# Patient Record
Sex: MALE | Race: WHITE | NOT HISPANIC OR LATINO | Employment: OTHER | URBAN - METROPOLITAN AREA
[De-identification: names, ages, dates, MRNs, and addresses within clinical notes are randomized per-mention and may not be internally consistent; named-entity substitution may affect disease eponyms.]

---

## 2023-11-01 ENCOUNTER — OFFICE VISIT (OUTPATIENT)
Dept: PRIMARY CARE CLINIC | Facility: CLINIC | Age: 68
End: 2023-11-01
Payer: MEDICARE

## 2023-11-01 VITALS
TEMPERATURE: 98 F | RESPIRATION RATE: 16 BRPM | SYSTOLIC BLOOD PRESSURE: 115 MMHG | BODY MASS INDEX: 23.2 KG/M2 | OXYGEN SATURATION: 95 % | HEIGHT: 67 IN | DIASTOLIC BLOOD PRESSURE: 73 MMHG | HEART RATE: 76 BPM | WEIGHT: 147.81 LBS

## 2023-11-01 DIAGNOSIS — F17.210 NICOTINE DEPENDENCE, CIGARETTES, UNCOMPLICATED: ICD-10-CM

## 2023-11-01 DIAGNOSIS — F33.41 RECURRENT MAJOR DEPRESSIVE DISORDER, IN PARTIAL REMISSION: ICD-10-CM

## 2023-11-01 DIAGNOSIS — K13.0 ANGULAR CHEILITIS: ICD-10-CM

## 2023-11-01 DIAGNOSIS — I25.10 CORONARY ARTERY DISEASE INVOLVING NATIVE CORONARY ARTERY OF NATIVE HEART WITHOUT ANGINA PECTORIS: ICD-10-CM

## 2023-11-01 DIAGNOSIS — G25.81 RESTLESS LEG SYNDROME: ICD-10-CM

## 2023-11-01 DIAGNOSIS — F17.200 TOBACCO DEPENDENCE: ICD-10-CM

## 2023-11-01 DIAGNOSIS — Z11.59 NEED FOR HEPATITIS C SCREENING TEST: ICD-10-CM

## 2023-11-01 DIAGNOSIS — Z00.00 ENCOUNTER FOR MEDICARE ANNUAL WELLNESS EXAM: ICD-10-CM

## 2023-11-01 DIAGNOSIS — E78.2 MIXED HYPERLIPIDEMIA: ICD-10-CM

## 2023-11-01 DIAGNOSIS — Z21 HIV POSITIVE, ASYMPTOMATIC: Primary | ICD-10-CM

## 2023-11-01 PROCEDURE — G0438 PR WELCOME MEDICARE ANNUAL WELLNESS INITIAL VISIT: ICD-10-PCS | Mod: S$GLB,,, | Performed by: INTERNAL MEDICINE

## 2023-11-01 PROCEDURE — 99204 PR OFFICE/OUTPT VISIT, NEW, LEVL IV, 45-59 MIN: ICD-10-PCS | Mod: 25,S$GLB,, | Performed by: INTERNAL MEDICINE

## 2023-11-01 PROCEDURE — G0438 PPPS, INITIAL VISIT: HCPCS | Mod: S$GLB,,, | Performed by: INTERNAL MEDICINE

## 2023-11-01 PROCEDURE — 99204 OFFICE O/P NEW MOD 45 MIN: CPT | Mod: 25,S$GLB,, | Performed by: INTERNAL MEDICINE

## 2023-11-01 RX ORDER — EZETIMIBE 10 MG/1
10 TABLET ORAL DAILY
COMMUNITY
End: 2023-12-12

## 2023-11-01 RX ORDER — PRAMIPEXOLE DIHYDROCHLORIDE 0.5 MG/1
0.5 TABLET ORAL 3 TIMES DAILY
COMMUNITY
End: 2024-03-27 | Stop reason: SDUPTHER

## 2023-11-01 RX ORDER — ATORVASTATIN CALCIUM 80 MG/1
80 TABLET, FILM COATED ORAL DAILY
COMMUNITY
End: 2024-02-26 | Stop reason: SDUPTHER

## 2023-11-01 RX ORDER — EFAVIRENZ, EMTRICITABINE AND TENOFOVIR DISOPROXIL FUMARATE 600; 200; 300 MG/1; MG/1; MG/1
1 TABLET, FILM COATED ORAL NIGHTLY
COMMUNITY
End: 2024-02-26 | Stop reason: SDUPTHER

## 2023-11-01 RX ORDER — BUPROPION HYDROCHLORIDE 300 MG/1
300 TABLET ORAL DAILY
COMMUNITY

## 2023-11-01 RX ORDER — SERTRALINE HYDROCHLORIDE 100 MG/1
100 TABLET, FILM COATED ORAL DAILY
COMMUNITY

## 2023-11-01 RX ORDER — OXCARBAZEPINE 150 MG/1
150 TABLET, FILM COATED ORAL 2 TIMES DAILY
COMMUNITY
End: 2024-02-26 | Stop reason: SDUPTHER

## 2023-11-01 RX ORDER — LANOLIN ALCOHOL/MO/W.PET/CERES
100 CREAM (GRAM) TOPICAL DAILY
COMMUNITY

## 2023-11-01 RX ORDER — FOLIC ACID 0.4 MG
400 TABLET ORAL DAILY
COMMUNITY

## 2023-11-01 NOTE — PROGRESS NOTES
Subjective:      Patient ID: Hemant Pepe is a 68 y.o. male.    Chief Complaint: Rash (Rash on lower lip, onset 2-3 wks prior)    Patient is here to establish care and for wellness exam.    Patient is tested positive for HIV in 2003 (not sure how he contracted it), patient is on medication since the diagnosis and is compliant with medication.  Patient does not remember his CD4 count but reports viral load was undetectable.  Patient denies any complaints    Patient also has history of coronary artery disease s/p CABG in 2017.  Patient reports tolerating the med surgery well.  Patient denies any chest pain shortness some breath ankle swelling.  Patient is on atorvastatin and Zetia and taking medication regularly.  Patient is also on Eliquis for anticoagulation after surgery.  Of atrial fibrillation.    Patient has depression and is on Wellbutrin and Zoloft and symptoms are under good control.  Patient denies any crying spell, lack of energy/interest, no feeling of guilt worthlessness, no suicidal homicidal ideation.  Patient has restless leg syndrome and is taking Mirapex with much help.    Has chilitis involving bilateral corners of the lip x few weeks. The rash is itchy, no oozing or discharge. Patient denies any blister.  Patient uses dentures + denies dry mouth.       Review of Systems   Constitutional:  Negative for chills, diaphoresis, fever, malaise/fatigue and weight loss.   HENT:  Negative for congestion, ear pain, sinus pain, sore throat and tinnitus.    Eyes:  Negative for blurred vision and photophobia.   Respiratory:  Negative for cough, hemoptysis, shortness of breath and wheezing.    Cardiovascular:  Negative for chest pain, palpitations, orthopnea, leg swelling and PND.   Gastrointestinal:  Negative for abdominal pain, blood in stool, constipation, diarrhea, heartburn, melena, nausea and vomiting.   Genitourinary:  Negative for dysuria, frequency and urgency.   Musculoskeletal:  Negative for  "back pain, myalgias and neck pain.   Skin:  Positive for rash.   Neurological:  Negative for dizziness, tremors, seizures, loss of consciousness and weakness.   Endo/Heme/Allergies:  Negative for polydipsia.   Psychiatric/Behavioral:  Negative for depression and hallucinations. The patient does not have insomnia.      Objective:   /73 (BP Location: Left arm, Patient Position: Sitting, BP Method: Medium (Automatic))   Pulse 76   Temp 97.6 °F (36.4 °C) (Temporal)   Resp 16   Ht 5' 7" (1.702 m)   Wt 67 kg (147 lb 12.8 oz)   SpO2 95%   BMI 23.15 kg/m²     Physical Exam  Constitutional:       General: He is not in acute distress.     Appearance: He is not diaphoretic.   Neck:      Thyroid: No thyromegaly.   Cardiovascular:      Rate and Rhythm: Normal rate and regular rhythm.      Heart sounds: Normal heart sounds. No murmur heard.  Pulmonary:      Effort: Pulmonary effort is normal. No respiratory distress.      Breath sounds: Normal breath sounds. No wheezing.   Abdominal:      General: Bowel sounds are normal. There is no distension.      Palpations: Abdomen is soft.      Tenderness: There is no abdominal tenderness.   Lymphadenopathy:      Cervical: No cervical adenopathy.   Skin:     Comments: Raw skin with mild erythema noted on both corners of the lips   Neurological:      Mental Status: He is alert and oriented to person, place, and time.   Psychiatric:         Behavior: Behavior normal.         Thought Content: Thought content normal.         Judgment: Judgment normal.       Assessment:       ICD-10-CM ICD-9-CM   1. HIV positive, asymptomatic  Z21 V08   2. Coronary artery disease involving native coronary artery of native heart without angina pectoris  I25.10 414.01   3. Recurrent major depressive disorder, in partial remission  F33.41 296.35   4. Mixed hyperlipidemia  E78.2 272.2   5. Restless leg syndrome  G25.81 333.94   6. Angular cheilitis  K13.0 528.5   7. Tobacco dependence  F17.200 305.1 "   8. Nicotine dependence, cigarettes, uncomplicated  F17.210 305.1   9. Need for hepatitis C screening test  Z11.59 V73.89   10. Encounter for Medicare annual wellness exam  Z00.00 V70.0       Plan:     Patient with HIV, viral load is undetectable CD4 count is not known.  Will order viral load and CD4 count.   Will refer to Infectious Disease  Patient has coronary artery disease s/p CABG.  Aggressive blood pressure, cholesterol control is recommended  Patient is on Eliquis and not sure why he is on the medication.  Will get notes from previous PCP  Patient has Chilitis advised patient to use Petroleum jelly and Clotirmazole cream.  Patient reports depression is under okay control Will continue medication for now  Patient did feel a little depressed after moving from [place] to [place] but reports mood is getting better.  Patient has restless leg syndrome that is controlled with Mirapex.  Will continue medicine      Medication List with Changes/Refills   Current Medications    APIXABAN (ELIQUIS) 5 MG TAB    Take 5 mg by mouth 2 (two) times daily.    ATORVASTATIN (LIPITOR) 80 MG TABLET    Take 80 mg by mouth once daily.    BUPROPION (WELLBUTRIN XL) 300 MG 24 HR TABLET    Take 300 mg by mouth once daily.    CYANOCOBALAMIN (VITAMIN B-12) 1000 MCG TABLET    Take 100 mcg by mouth once daily. OTC    EFAVIRENZ-EMTRICTABINE-TENOFOVIR 600-200-300 MG (ATRIPLA) 600-200-300 MG TAB    Take 1 tablet by mouth every evening.    EZETIMIBE (ZETIA) 10 MG TABLET    Take 10 mg by mouth once daily.    FOLIC ACID (FOLVITE) 400 MCG TABLET    Take 400 mcg by mouth once daily.    OXCARBAZEPINE (TRILEPTAL) 150 MG TAB    Take 150 mg by mouth 2 (two) times daily.    PRAMIPEXOLE (MIRAPEX) 0.5 MG TABLET    Take 0.5 mg by mouth 3 (three) times daily.    SERTRALINE (ZOLOFT) 100 MG TABLET    Take 100 mg by mouth once daily.        Hemant Pepe presented for a  Medicare AWV and comprehensive Health Risk Assessment today. The following  components were reviewed and updated:      Health Risk Assessment  During the past four weeks, was someone available to help if you needed and wanted help?: Yes, as much as I wanted  Can you get to places out of walking distance without help?  (For example, can you travel alone on buses or taxis, or drive your own car?): Yes  Can you go shopping for groceries or clothes without someone's help?: Yes  Can you prepare your own meals?: Yes  Can you do your own housework without help?: Yes  Are you a smoker?: Yes, I'm not ready to quit   The patient has no Health Maintenance topics of status Not Due     Patient Care Team:  Andrew Walters MD as PCP - General (Internal Medicine)          ** See Completed Assessments for Annual Wellness Visit within the encounter summary.**     Patient reports he has received 2 COVID vaccines.  Advised patient to get booster  Patient is not aware of his pneumonia vaccine status.  Will get notes from previous PCP  Patient reports he has received Shingrix vaccine  Will administer flu vaccine  Patient colonoscopy was due in 2022.  Will order  Patient has more than 40 pack years of smoking.  Will refer to smoking cessation program  Will order CT chest for lung cancer screening        The following assessments were completed:    Depression Screening  Depression Patient Health Questionnaire (PHQ-2)  Over the last two weeks how often have you been bothered by little interest or pleasure in doing things: Not at all  Over the last two weeks how often have you been bothered by feeling down, depressed or hopeless: Not at all  PHQ-2 Total Score: 0   T  Whisper Test  Whisper Test: Normal     Cognitive Function Screening  Mini-Cog Instant Recall  How many words have been recalled?: 3  Clock Drawing Test  The total for the clock drawing test is either 0 or 2: Yes  Clock Drawing Score: 2  Mini-Cog 3 minute recall  How many words have been recalled?: 3  Cognitive Score: 5     Nutrition Screening: Body  mass index is 23.15 kg/m².  ADL Screening:  Patient reports he is able to do ADLs without any assistance    Patient denies use of control medication including narcotics or benzodiazepines    Provided Hemant with a 5-10 year written screening schedule and personal prevention plan. Recommendations were developed using the USPSTF age appropriate recommendations. Education, counseling, and referrals were provided as needed. After Visit Summary printed and given to patient which includes a list of additional screenings\tests needed.    I offered to discuss end of life issues, including information on how to make advance directives that the patient could use to name someone who would make medical decisions on their behalf if they became too ill to make themselves.    ___Patient declined - already done.  ___Virtual Visit, not discussed  _xx__Patient is interested, I provided paperwork and offered to discuss        No follow-ups on file.    Andrew Walters MD

## 2023-11-08 LAB
ABS NRBC COUNT: 0 THOU/UL (ref 0–0.01)
ABSOLUTE BASOPHIL: 0 10*3/UL (ref 0–0.3)
ABSOLUTE EOSINOPHIL: 0.2 10*3/UL (ref 0–0.6)
ABSOLUTE IMMATURE GRAN: 0.01 THOU/UL (ref 0–0.03)
ABSOLUTE LYMPHOCYTE: 1.4 10*3/UL (ref 1.2–4)
ABSOLUTE MONOCYTE: 0.4 10*3/UL (ref 0.1–0.8)
ALBUMIN SERPL BCP-MCNC: 3.5 G/DL (ref 3.4–5)
ALP SERPL-CCNC: 89 U/L (ref 45–117)
ALT SERPL W P-5'-P-CCNC: 24 U/L (ref 16–61)
ANION GAP SERPL CALC-SCNC: 9 MMOL/L (ref 3–11)
AST SERPL-CCNC: 26 U/L (ref 15–37)
BASOPHILS NFR BLD: 0.7 % (ref 0–3)
BILIRUB SERPL-MCNC: 0.4 MG/DL (ref 0.2–1)
BUN SERPL-MCNC: 14 MG/DL (ref 7–18)
BUN/CREAT SERPL: 11.76 RATIO
CALCIUM SERPL-MCNC: 8.5 MG/DL (ref 8.5–10.1)
CHLORIDE SERPL-SCNC: 106 MMOL/L (ref 98–107)
CHOLEST SERPL-MSCNC: 125 MG/DL
CO2 SERPL-SCNC: 25 MMOL/L (ref 21–32)
CREAT SERPL-MCNC: 1.19 MG/DL (ref 0.7–1.3)
EOSINOPHIL NFR BLD: 3.6 % (ref 0–6)
ERYTHROCYTE [DISTWIDTH] IN BLOOD BY AUTOMATED COUNT: 12.3 % (ref 0–15.5)
GFR ESTIMATION: > 60
GLUCOSE SERPL-MCNC: 93 MG/DL (ref 74–106)
HCT VFR BLD AUTO: 42.2 % (ref 42–52)
HCV AB SERPL QL IA: NONREACTIVE
HDLC SERPL-MCNC: 53 MG/DL
HGB BLD-MCNC: 14.1 G/DL (ref 14–18)
IMMATURE GRANULOCYTES: 0.2 % (ref 0–0.43)
LDLC SERPL CALC-MCNC: 59.4 MG/DL
LYMPHOCYTES NFR BLD: 24.7 % (ref 20–45)
MACROCYTES BLD QL SMEAR: NORMAL
MCH RBC QN AUTO: 37.1 PG (ref 27–32)
MCHC RBC AUTO-ENTMCNC: 33.4 % (ref 32–36)
MCV RBC AUTO: 111.1 FL (ref 80–97)
MONOCYTES NFR BLD: 7.6 % (ref 2–10)
NEUTROPHILS # BLD AUTO: 3.5 10*3/UL (ref 1.4–7)
NEUTROPHILS NFR BLD: 63.2 % (ref 50–80)
NUCLEATED RED BLOOD CELLS: 0 % (ref 0–0.2)
PLATELETS: 214 10*3/UL (ref 130–400)
PMV BLD AUTO: 10.9 FL (ref 9.2–12.2)
POLYCHROMASIA BLD QL SMEAR: NORMAL
POTASSIUM SERPL-SCNC: 4.1 MMOL/L (ref 3.5–5.1)
PROT SERPL-MCNC: 6.5 G/DL (ref 6.4–8.2)
RBC # BLD AUTO: 3.8 10*6/UL (ref 4.7–6.1)
SODIUM BLD-SCNC: 140 MMOL/L (ref 131–143)
TRIGL SERPL-MCNC: 63 MG/DL (ref 0–149)
TSH SERPL DL<=0.005 MIU/L-ACNC: 2.62 UIU/ML (ref 0.36–3.74)
VLDL CHOLESTEROL: 13 MG/DL
WBC # BLD: 5.6 10*3/UL (ref 4.5–10)

## 2023-11-10 DIAGNOSIS — D53.9 NUTRITIONAL ANEMIA, UNSPECIFIED: Primary | ICD-10-CM

## 2023-11-10 LAB
CD4 %: 42 % (ref 35–68)
CD4 INTERPRETATION: NORMAL
CD4: 595 CELLS/UL (ref 490–1600)

## 2023-11-13 ENCOUNTER — TELEPHONE (OUTPATIENT)
Dept: PRIMARY CARE CLINIC | Facility: CLINIC | Age: 68
End: 2023-11-13

## 2023-11-13 NOTE — TELEPHONE ENCOUNTER
----- Message from Aurea Otero MA sent at 11/10/2023  5:11 PM CST -----  Contact: self    ----- Message -----  From: Chika Chapa  Sent: 11/10/2023  12:20 PM CST  To: Romeo Morales Staff    Type: Staff Message    Caller: Hemant Pepe    Call Back Number: [phone]    Nature of the Call: returning Aurea white called  Additional Information: na

## 2023-11-16 LAB
HIV 1 AB BY MULTISPOT: POSITIVE
HIV 1/2 AB DIFFERENTIATION: ABNORMAL
HIV 2 AB BY MULTISPOT: NEGATIVE
HIV DIFFERENTIATION INTERPRETATION: ABNORMAL
HIV-1 QUANT BY NAAT (COPIES/ML): NOT DETECTED CPY/ML
HIV-1 QUANT BY NAAT (LOG COPIES/ML): NOT DETECTED LOG CPY/ML
HIV-1 QUANT BY NAAT INTERP: NOT DETECTED

## 2023-11-20 ENCOUNTER — TELEPHONE (OUTPATIENT)
Dept: PRIMARY CARE CLINIC | Facility: CLINIC | Age: 68
End: 2023-11-20

## 2023-11-20 NOTE — TELEPHONE ENCOUNTER
----- Message from Julio Cesar Moran MA sent at 11/17/2023 11:25 AM CST -----  Contact: self    ----- Message -----  From: Kelsey Taylor  Sent: 11/17/2023  10:07 AM CST  To: Romeo Morales Staff    Type:  Patient Returning Call    Who Called: Hemant Pepe   Who Left Message for Patient: Aurea  Does the patient know what this is regarding?: Results  Would the patient rather a call back or a response via [facility]?  Call back  Best Call Back Number: [phone]   Additional Information: n/a

## 2023-12-12 ENCOUNTER — OFFICE VISIT (OUTPATIENT)
Dept: PRIMARY CARE CLINIC | Facility: CLINIC | Age: 68
End: 2023-12-12
Payer: MEDICARE

## 2023-12-12 VITALS
SYSTOLIC BLOOD PRESSURE: 122 MMHG | HEART RATE: 65 BPM | BODY MASS INDEX: 22.44 KG/M2 | TEMPERATURE: 97 F | OXYGEN SATURATION: 97 % | DIASTOLIC BLOOD PRESSURE: 73 MMHG | RESPIRATION RATE: 16 BRPM | WEIGHT: 143 LBS | HEIGHT: 67 IN

## 2023-12-12 DIAGNOSIS — Z13.6 SCREENING FOR AAA (AORTIC ABDOMINAL ANEURYSM): ICD-10-CM

## 2023-12-12 DIAGNOSIS — Z12.11 COLON CANCER SCREENING: ICD-10-CM

## 2023-12-12 DIAGNOSIS — Z21 ASYMPTOMATIC HIV INFECTION, WITH NO HISTORY OF HIV-RELATED ILLNESS: Primary | ICD-10-CM

## 2023-12-12 DIAGNOSIS — I25.10 CORONARY ARTERY DISEASE INVOLVING NATIVE CORONARY ARTERY OF NATIVE HEART WITHOUT ANGINA PECTORIS: ICD-10-CM

## 2023-12-12 PROCEDURE — 99214 OFFICE O/P EST MOD 30 MIN: CPT | Mod: S$GLB,,, | Performed by: INTERNAL MEDICINE

## 2023-12-12 PROCEDURE — 99214 PR OFFICE/OUTPT VISIT, EST, LEVL IV, 30-39 MIN: ICD-10-PCS | Mod: S$GLB,,, | Performed by: INTERNAL MEDICINE

## 2023-12-12 NOTE — PROGRESS NOTES
Subjective:      Patient ID: Hemant Pepe is a 68 y.o. male.    Chief Complaint: Follow-up (1month f/u discuss need for colonoscopy)     Patient is tested positive for HIV in 2003 and is on antiviral.  Patient is tolerating the medication well and reports compliance with medication.  Patient last CD4 count is 595 and viral load is undetectable.  Patient denies any complaints    Patient has coronary artery disease s/p CABG in 2017.  Patient denies any chest pain shortness of breath ankle swelling.  Patient last LDL of 59 is not at goal.  Patient reports compliance with atorvastatin and Zetia.  Patient is on Eliquis for anticoagulation after surgery but denies history of atrial fibrillation.  Patient is still smoking and a CT chest for lung cancer screening was ordered but not yet done.  Patient is also refer to smoking cessation program but has not yet received an appointment.    Patient has depression that is under good control with Wellbutrin and Zoloft.      Review of Systems   Constitutional:  Negative for chills, diaphoresis, fever, malaise/fatigue and weight loss.   HENT:  Negative for congestion, ear pain, sinus pain, sore throat and tinnitus.    Eyes:  Negative for blurred vision and photophobia.   Respiratory:  Negative for cough, hemoptysis, shortness of breath and wheezing.    Cardiovascular:  Negative for chest pain, palpitations, orthopnea, leg swelling and PND.   Gastrointestinal:  Negative for abdominal pain, blood in stool, constipation, diarrhea, heartburn, melena, nausea and vomiting.   Genitourinary:  Negative for dysuria, frequency and urgency.   Musculoskeletal:  Negative for back pain, myalgias and neck pain.   Skin:  Negative for rash.   Neurological:  Negative for dizziness, tremors, seizures, loss of consciousness and weakness.   Endo/Heme/Allergies:  Negative for polydipsia.   Psychiatric/Behavioral:  Negative for depression and hallucinations. The patient does not have insomnia.   "    Objective:   /73 (BP Location: Left arm, Patient Position: Sitting, BP Method: Medium (Automatic))   Pulse 65   Temp 97 °F (36.1 °C) (Temporal)   Resp 16   Ht 5' 7" (1.702 m)   Wt 64.9 kg (143 lb)   SpO2 97%   BMI 22.40 kg/m²     Physical Exam: Patient Not examined  Assessment:       ICD-10-CM ICD-9-CM   1. Asymptomatic HIV infection, with no history of HIV-related illness  Z21 V08   2. Coronary artery disease involving native coronary artery of native heart without angina pectoris  I25.10 414.01   3. Colon cancer screening  Z12.11 V76.51   4. Screening for AAA (aortic abdominal aneurysm)  Z13.6 V81.2   5. HIV positive, asymptomatic  Z21 V08       Plan:     Patient with HIV with viral load undetectable and CD4 count of 595  Patient is on antiviral and is tolerating the medication well  Will refer to Infectious Disease  Patient has coronary artery disease and last LDL of 59 is not at goal  Will add Nexlizet and stop Zetia  Will refer to GI for screening colonoscopy  Will order ultrasound to screen for abdominal aortic aneurysm  Patient is on Eliquis for anticoagulation but not sure the cause/reason of prescribing medication  Will reach out to patient cardiologist Michele Baker [phone].  To discuss need to continue anticoagulation.  Patient is referred to smoking cessation program but has not yet received an appointment.  Will follow-up on referral  Patient is also refer for CT chest for lung cancer screening but has not yet received an appointment.  Will follow-up on referral    Medication List with Changes/Refills   Current Medications    APIXABAN (ELIQUIS) 5 MG TAB    Take 5 mg by mouth 2 (two) times daily.    ATORVASTATIN (LIPITOR) 80 MG TABLET    Take 80 mg by mouth once daily.    BUPROPION (WELLBUTRIN XL) 300 MG 24 HR TABLET    Take 300 mg by mouth once daily.    CYANOCOBALAMIN (VITAMIN B-12) 1000 MCG TABLET    Take 100 mcg by mouth once daily. OTC    EFAVIRENZ-EMTRICTABINE-TENOFOVIR " 600-200-300 MG (ATRIPLA) 600-200-300 MG TAB    Take 1 tablet by mouth every evening.    EZETIMIBE (ZETIA) 10 MG TABLET    Take 10 mg by mouth once daily.    FOLIC ACID (FOLVITE) 400 MCG TABLET    Take 400 mcg by mouth once daily.    OXCARBAZEPINE (TRILEPTAL) 150 MG TAB    Take 150 mg by mouth 2 (two) times daily.    PRAMIPEXOLE (MIRAPEX) 0.5 MG TABLET    Take 0.5 mg by mouth 3 (three) times daily.    SERTRALINE (ZOLOFT) 100 MG TABLET    Take 100 mg by mouth once daily.

## 2023-12-14 ENCOUNTER — TELEPHONE (OUTPATIENT)
Dept: PRIMARY CARE CLINIC | Facility: CLINIC | Age: 68
End: 2023-12-14
Payer: MEDICARE

## 2023-12-19 ENCOUNTER — TELEPHONE (OUTPATIENT)
Dept: PRIMARY CARE CLINIC | Facility: CLINIC | Age: 68
End: 2023-12-19
Payer: MEDICARE

## 2023-12-29 NOTE — PROGRESS NOTES
Clinic Note    Reason for visit:  The primary encounter diagnosis was Encounter for current long-term use of anticoagulants. A diagnosis of Colon cancer screening was also pertinent to this visit.    PCP: Andrew Walters / [place] [id number]    HPI:  This is a 68 y.o. male here to be established. Referred for CRC screening. He did have colonoscopy 10 years ago which was normal. Having daily BM. No blood in the stool.     11/8/2023 HIV AB+, VL-not detected.    Review of Systems   Constitutional:  Negative for diaphoresis, fatigue, fever and unexpected weight change.   HENT:  Negative for hearing loss, mouth sores, postnasal drip, sore throat and trouble swallowing.    Eyes:  Negative for pain, discharge and eye dryness.   Respiratory:  Negative for apnea, cough, choking, chest tightness, shortness of breath and wheezing.    Cardiovascular:  Negative for chest pain, palpitations and leg swelling.   Gastrointestinal:  Negative for abdominal distention, abdominal pain, anal bleeding, blood in stool, change in bowel habit, constipation, diarrhea, nausea, rectal pain, vomiting, reflux and fecal incontinence.   Genitourinary:  Negative for bladder incontinence, dysuria and hematuria.   Musculoskeletal:  Negative for arthralgias, back pain and joint swelling.   Integumentary:  Negative for color change and rash.   Allergic/Immunologic: Negative for environmental allergies and food allergies.   Neurological:  Negative for seizures and headaches.   Hematological:  Negative for adenopathy. Does not bruise/bleed easily.        Past Medical History:   Diagnosis Date    CAD (coronary artery disease)     Depression     HIV infection     Hyperlipidemia     Restless leg syndrome      Past Surgical History:   Procedure Laterality Date    CORONARY ARTERY BYPASS GRAFT  11/2017    [place]     Family History   Problem Relation Age of Onset    Heart failure Mother     Prostate cancer Father     Heart failure  Sister     Prostate cancer Brother     Colon cancer Neg Hx     Colon polyps Neg Hx     Irritable bowel syndrome Neg Hx     Liver cancer Neg Hx     Rectal cancer Neg Hx     Stomach cancer Neg Hx     Ulcerative colitis Neg Hx      Social History     Tobacco Use    Smoking status: Every Day     Current packs/day: 0.50     Average packs/day: 0.9 packs/day for 49.0 years (45.5 ttl pk-yrs)     Types: Cigarettes     Start date: 1975    Smokeless tobacco: Never   Substance Use Topics    Alcohol use: Yes     Alcohol/week: 1.0 standard drink of alcohol     Types: 1 Shots of liquor per week    Drug use: Not Currently     Review of patient's allergies indicates:  No Known Allergies   Medication List with Changes/Refills   New Medications    SOD SULF-POT CHLORIDE-MAG SULF (SUTAB) 1.479-0.188- 0.225 GRAM TABLET    Take according to package instructions with indicated amount of water. No breakfast day before test. May substitute with Suprep, Clenpiq, Plenvu, Moviprep or GoLytely based on Rx plan and patient preference.   Current Medications    APIXABAN (ELIQUIS) 5 MG TAB    Take 5 mg by mouth 2 (two) times daily.    ATORVASTATIN (LIPITOR) 80 MG TABLET    Take 80 mg by mouth once daily.    BEMPEDOIC ACID-EZETIMIBE 180-10 MG TAB    Take 1 tablet by mouth every evening.    BUPROPION (WELLBUTRIN XL) 300 MG 24 HR TABLET    Take 300 mg by mouth once daily.    CYANOCOBALAMIN (VITAMIN B-12) 1000 MCG TABLET    Take 100 mcg by mouth once daily. OTC    EFAVIRENZ-EMTRICTABINE-TENOFOVIR 600-200-300 MG (ATRIPLA) 600-200-300 MG TAB    Take 1 tablet by mouth every evening.    FOLIC ACID (FOLVITE) 400 MCG TABLET    Take 400 mcg by mouth once daily.    OXCARBAZEPINE (TRILEPTAL) 150 MG TAB    Take 150 mg by mouth 2 (two) times daily.    PRAMIPEXOLE (MIRAPEX) 0.5 MG TABLET    Take 0.5 mg by mouth 3 (three) times daily.    SERTRALINE (ZOLOFT) 100 MG TABLET    Take 100 mg by mouth once daily.         Vital Signs:  /72 (BP Location: Left arm,  "Patient Position: Sitting)   Pulse 83   Ht 5' 7" (1.702 m)   Wt 65.5 kg (144 lb 6.4 oz)   SpO2 98%   BMI 22.62 kg/m²        Physical Exam  Constitutional:       General: He is not in acute distress.     Appearance: Normal appearance. He is well-developed. He is not ill-appearing or toxic-appearing.   HENT:      Head: Normocephalic and atraumatic.      Nose: Nose normal.      Mouth/Throat:      Mouth: Mucous membranes are moist.      Pharynx: Oropharynx is clear. No oropharyngeal exudate or posterior oropharyngeal erythema.   Eyes:      General: Lids are normal. No scleral icterus.        Right eye: No discharge.         Left eye: No discharge.      Conjunctiva/sclera: Conjunctivae normal.   Cardiovascular:      Rate and Rhythm: Normal rate and regular rhythm.      Pulses:           Radial pulses are 2+ on the right side and 2+ on the left side.   Pulmonary:      Effort: Pulmonary effort is normal. No respiratory distress.      Breath sounds: No stridor. No wheezing.   Abdominal:      General: Bowel sounds are normal. There is no distension.      Palpations: Abdomen is soft. There is no fluid wave, hepatomegaly, splenomegaly or mass.      Tenderness: There is no abdominal tenderness. There is no guarding or rebound.   Musculoskeletal:      Cervical back: Full passive range of motion without pain.   Lymphadenopathy:      Cervical: No cervical adenopathy.   Skin:     General: Skin is warm and dry.      Capillary Refill: Capillary refill takes less than 2 seconds.      Coloration: Skin is not cyanotic, jaundiced or pale.   Neurological:      General: No focal deficit present.      Mental Status: He is alert and oriented to person, place, and time.   Psychiatric:         Mood and Affect: Mood normal.         Behavior: Behavior is cooperative.            All of the data above and below has been reviewed by myself and any further interpretations will be reflected in the assessment and plan.   The data includes review " of external notes, and independent interpretation of lab results, procedures, x-rays, and imaging reports.      Assessment:  Encounter for current long-term use of anticoagulants    Colon cancer screening  -     Ambulatory referral/consult to Gastroenterology  -     Ambulatory Referral to External Surgery  -     sod sulf-pot chloride-mag sulf (SUTAB) 1.479-0.188- 0.225 gram tablet; Take according to package instructions with indicated amount of water. No breakfast day before test. May substitute with Suprep, Clenpiq, Plenvu, Moviprep or GoLytely based on Rx plan and patient preference.  Dispense: 24 tablet; Refill: 0      CRC screening- due for CRC screening  Will need to stop Eliquis 2 days before procedure.     Recommendations:    Schedule for Colonsocopy with Dr. Styles.  Stop Eliquis 2 days before procedure.    Risks, benefits, and alternatives of medical management, any associated procedures, and/or treatment discussed with the patient. Patient given opportunity to ask questions and voices understanding. Patient has elected to proceed with the recommended care modalities as discussed.    Follow up if symptoms worsen or fail to improve.    Order summary:  Orders Placed This Encounter   Procedures    Ambulatory Referral to External Surgery        Instructed patient to notify my office if they have not been contacted within two weeks after any procedures, submitting any samples (biopsies, blood, stool, urine, etc.) or after any imaging (X-ray, CT, MRI, etc.).      Polly Garcia NP    This document may have been created using a voice recognition transcribing system. Incorrect words or phrases may have been missed during proofreading. Please interpret accordingly or contact me for clarification.

## 2024-01-03 ENCOUNTER — OFFICE VISIT (OUTPATIENT)
Dept: GASTROENTEROLOGY | Facility: CLINIC | Age: 69
End: 2024-01-03
Payer: MEDICARE

## 2024-01-03 VITALS
HEIGHT: 67 IN | HEART RATE: 83 BPM | WEIGHT: 144.38 LBS | BODY MASS INDEX: 22.66 KG/M2 | DIASTOLIC BLOOD PRESSURE: 72 MMHG | OXYGEN SATURATION: 98 % | SYSTOLIC BLOOD PRESSURE: 110 MMHG

## 2024-01-03 DIAGNOSIS — Z79.01 ENCOUNTER FOR CURRENT LONG-TERM USE OF ANTICOAGULANTS: Primary | ICD-10-CM

## 2024-01-03 DIAGNOSIS — Z12.11 COLON CANCER SCREENING: ICD-10-CM

## 2024-01-03 PROCEDURE — 99212 OFFICE O/P EST SF 10 MIN: CPT | Mod: S$GLB,,, | Performed by: NURSE PRACTITIONER

## 2024-01-03 RX ORDER — SOD SULF/POT CHLORIDE/MAG SULF 1.479 G
TABLET ORAL
Qty: 24 TABLET | Refills: 0 | Status: SHIPPED | OUTPATIENT
Start: 2024-01-03

## 2024-01-03 NOTE — LETTER
January 3, 2024        Andrew Walters MD  [address]  [place] [address]             [facility]  [address] [place] [address]  Phone: [phone]  Fax: [phone]   Patient: Hemant Pepe   MR Number: [id number]   YOB: 1955   Date of Visit: 1/3/2024     Dear Dr. Walters,     I am writing to you for some assistance with a patient that is currently under your care. Hemant Pepe  1955  has seen Polly Garcia and needs to be scheduled for a Colonoscopy with Dr. Styles. Ideally, the patient would hold the Eliquis for 2 days in the event that interventions are needed (dilation, polypectomy, biopsies, etc.). Since Dr. Styles is not managing this medication, we would like your assistance in approving the above request. Please fax response to [phone]. Your response is greatly appreciated and will assist us in providing optimal patient care. Should you have any questions or concerns, please do not hesitate to contact me at [phone].           Sincerely,      Polly Garcia, YADI        CC  No Recipients    Enclosure

## 2024-02-26 ENCOUNTER — TELEPHONE (OUTPATIENT)
Dept: FAMILY MEDICINE | Facility: CLINIC | Age: 69
End: 2024-02-26
Payer: MEDICARE

## 2024-02-26 DIAGNOSIS — Z21 ASYMPTOMATIC HIV INFECTION, WITH NO HISTORY OF HIV-RELATED ILLNESS: Primary | ICD-10-CM

## 2024-02-26 RX ORDER — EFAVIRENZ, EMTRICITABINE AND TENOFOVIR DISOPROXIL FUMARATE 600; 200; 300 MG/1; MG/1; MG/1
1 TABLET, FILM COATED ORAL NIGHTLY
Qty: 90 TABLET | Refills: 3 | Status: SHIPPED | OUTPATIENT
Start: 2024-02-26 | End: 2024-03-27 | Stop reason: SDUPTHER

## 2024-02-26 RX ORDER — ATORVASTATIN CALCIUM 80 MG/1
80 TABLET, FILM COATED ORAL DAILY
Qty: 90 TABLET | Refills: 3 | Status: SHIPPED | OUTPATIENT
Start: 2024-02-26 | End: 2025-02-25

## 2024-02-26 RX ORDER — EZETIMIBE 10 MG/1
10 TABLET ORAL DAILY
Qty: 90 TABLET | Refills: 3 | Status: SHIPPED | OUTPATIENT
Start: 2024-02-26 | End: 2025-02-25

## 2024-02-26 RX ORDER — OXCARBAZEPINE 150 MG/1
150 TABLET, FILM COATED ORAL 2 TIMES DAILY
Qty: 180 TABLET | Refills: 3 | Status: SHIPPED | OUTPATIENT
Start: 2024-02-26 | End: 2025-02-25

## 2024-02-26 RX ORDER — EZETIMIBE 10 MG/1
10 TABLET ORAL DAILY
COMMUNITY
End: 2024-02-26 | Stop reason: SDUPTHER

## 2024-02-26 NOTE — TELEPHONE ENCOUNTER
----- Message from Stephanie Diaz MA sent at 2/19/2024 11:14 AM CST -----    ----- Message -----  From: Cecilio Herrera MD  Sent: 2/19/2024  11:14 AM CST  To: Stephanie Diaz MA    OK please schedule with me or [facility] for HIV.  Thanks.  ----- Message -----  From: Stephanie Diaz MA  Sent: 2/16/2024   9:46 AM CST  To: Cecilio Herrera MD         No

## 2024-02-26 NOTE — TELEPHONE ENCOUNTER
----- Message from Stephanie Diaz MA sent at 2/26/2024 10:05 AM CST -----  Contact: self    ----- Message -----  From: Tammy Gomez  Sent: 2/26/2024  10:02 AM CST  To: Sharon Hernandes Staff    Type:  Patient Returning Call    Who Called:Hemant Pepe  Who Left Message for Patient:gail  Does the patient know what this is regarding?:appt  Would the patient rather a call back or a response via [facility]?   Best Call Back Number:[phone]  Additional Information: n/a

## 2024-02-26 NOTE — TELEPHONE ENCOUNTER
----- Message from Stephanie Diaz MA sent at 2/26/2024 10:05 AM CST -----  Contact: self    ----- Message -----  From: Tammy Gomez  Sent: 2/26/2024  10:02 AM CST  To: Sharon Hernandes Staff    Type:  Patient Returning Call    Who Called:Hemant Pepe  Who Left Message for Patient:gail  Does the patient know what this is regarding?:appt  Would the patient rather a call back or a response via [facility]?   Best Call Back Number:[phone]  Additional Information: n/a

## 2024-02-26 NOTE — TELEPHONE ENCOUNTER
----- Message from Aurea Otero MA sent at 2/26/2024  4:52 PM CST -----  Contact: Patient    ----- Message -----  From: Corina Coello  Sent: 2/26/2024   9:40 AM CST  To: Romeo Morales Staff    Patient called to consult with nurse or staff regarding his medication refills. He states he is having some issues and wanted to speak with the clinic. He would like a call back and can be reached at [phone]. Thanks/MR

## 2024-03-01 ENCOUNTER — TELEPHONE (OUTPATIENT)
Dept: FAMILY MEDICINE | Facility: CLINIC | Age: 69
End: 2024-03-01
Payer: MEDICARE

## 2024-03-01 NOTE — TELEPHONE ENCOUNTER
We have called patient multiple times, all documented in the chart, without any success to schedule for ID. Would you like us to continue to attempt to contact him or is the service no longer needed?

## 2024-03-07 ENCOUNTER — TELEPHONE (OUTPATIENT)
Dept: FAMILY MEDICINE | Facility: CLINIC | Age: 69
End: 2024-03-07
Payer: MEDICARE

## 2024-03-18 ENCOUNTER — TELEPHONE (OUTPATIENT)
Dept: PRIMARY CARE CLINIC | Facility: CLINIC | Age: 69
End: 2024-03-18
Payer: MEDICARE

## 2024-03-18 NOTE — TELEPHONE ENCOUNTER
Telephoned patient, patient verbally understood an appointment is needed so that paperwork can be filled out

## 2024-03-18 NOTE — TELEPHONE ENCOUNTER
----- Message from Corina Coello sent at 3/18/2024 11:53 AM CDT -----  Contact: Patient  Patient called to consult with nurse or staff regarding paperwork he is needing filled out by Dr. Walters. He wanted to speak with clinic about this and would like a call back. He can be reached at [phone]. Thanks/MR

## 2024-03-27 ENCOUNTER — OFFICE VISIT (OUTPATIENT)
Dept: PRIMARY CARE CLINIC | Facility: CLINIC | Age: 69
End: 2024-03-27
Payer: MEDICARE

## 2024-03-27 VITALS
BODY MASS INDEX: 22.44 KG/M2 | WEIGHT: 143 LBS | TEMPERATURE: 98 F | RESPIRATION RATE: 16 BRPM | SYSTOLIC BLOOD PRESSURE: 130 MMHG | DIASTOLIC BLOOD PRESSURE: 82 MMHG | HEIGHT: 67 IN | OXYGEN SATURATION: 97 % | HEART RATE: 71 BPM

## 2024-03-27 DIAGNOSIS — G25.81 RESTLESS LEG SYNDROME: ICD-10-CM

## 2024-03-27 DIAGNOSIS — F33.41 RECURRENT MAJOR DEPRESSIVE DISORDER, IN PARTIAL REMISSION: ICD-10-CM

## 2024-03-27 DIAGNOSIS — Z21 ASYMPTOMATIC HIV INFECTION, WITH NO HISTORY OF HIV-RELATED ILLNESS: Primary | ICD-10-CM

## 2024-03-27 DIAGNOSIS — I25.10 CORONARY ARTERY DISEASE INVOLVING NATIVE CORONARY ARTERY OF NATIVE HEART WITHOUT ANGINA PECTORIS: ICD-10-CM

## 2024-03-27 PROCEDURE — 99214 OFFICE O/P EST MOD 30 MIN: CPT | Mod: S$GLB,,, | Performed by: INTERNAL MEDICINE

## 2024-03-27 RX ORDER — PRAMIPEXOLE DIHYDROCHLORIDE 0.5 MG/1
0.5 TABLET ORAL 3 TIMES DAILY
Qty: 270 TABLET | Refills: 3 | Status: SHIPPED | OUTPATIENT
Start: 2024-03-27 | End: 2025-03-27

## 2024-03-27 RX ORDER — EFAVIRENZ, EMTRICITABINE AND TENOFOVIR DISOPROXIL FUMARATE 600; 200; 300 MG/1; MG/1; MG/1
1 TABLET, FILM COATED ORAL NIGHTLY
Qty: 90 TABLET | Refills: 0 | Status: SHIPPED | OUTPATIENT
Start: 2024-03-27 | End: 2024-04-11 | Stop reason: SDUPTHER

## 2024-03-27 NOTE — PROGRESS NOTES
Subjective:      Patient ID: Hemant Pepe is a 68 y.o. male.    Chief Complaint: Follow-up (F/u discuss DOTD paperwork )    HPI: Patient is tested positive for HIV in 2003 and is on antiviral. Patient is tolerating the medication well and reports compliance with medication. Patient last CD4 count is 595 and viral load is undetectable. Patient denies any complaints patient is referred to infectious disease but has not yet made an appointment.     Patient has coronary artery disease s/p CABG in 2017.  Patient denies any chest pain shortness of breath ankle swelling.  Patient last LDL of 59 is not at goal.  Patient reports compliance with atorvastatin and Zetia.  Patient is on Eliquis for anticoagulation after surgery but denies history of atrial fibrillation.     Patient has more than 45 pack years of smoking and CT chest for lung cancer screening is ordered but not yet done.  Ultrasound abdomen to screen for abdominal aortic aneurysm is also ordered but not yet done    Review of Systems   Constitutional:  Negative for malaise/fatigue and weight loss.   HENT:  Negative for ear pain, sinus pain and tinnitus.    Eyes:  Negative for blurred vision and photophobia.   Respiratory:  Negative for hemoptysis, shortness of breath and wheezing.    Cardiovascular:  Negative for palpitations, orthopnea, leg swelling and PND.   Gastrointestinal:  Negative for blood in stool, constipation, diarrhea, heartburn and melena.   Genitourinary:  Negative for dysuria, frequency and urgency.   Musculoskeletal:  Negative for back pain.   Neurological:  Negative for dizziness, tremors, seizures and loss of consciousness.   Endo/Heme/Allergies:  Negative for polydipsia.   Psychiatric/Behavioral:  Negative for depression and hallucinations. The patient does not have insomnia.      Objective:   /82 (BP Location: Right arm, Patient Position: Sitting, BP Method: Medium (Automatic))   Pulse 71   Temp 98 °F (36.7 °C) (Oral)   Resp 16  "  Ht 5' 7" (1.702 m)   Wt 64.9 kg (143 lb)   SpO2 97%   BMI 22.40 kg/m²     Physical Exam:  Patient not examined  Assessment:       ICD-10-CM ICD-9-CM   1. Asymptomatic HIV infection, with no history of HIV-related illness  Z21 V08   2. Recurrent major depressive disorder, in partial remission  F33.41 296.35   3. Restless leg syndrome  G25.81 333.94   4. Coronary artery disease involving native coronary artery of native heart without angina pectoris  I25.10 414.01       Plan:     Patient with HIV and is on antiviral and tolerating the medication well  Patient is referred to infectious disease but has not yet made an appointment  Advised patient to make an appointment  Patient has coronary artery disease s/p CABG but denies any cardiac symptoms  Patient last LDL was not at goal.  Will repeat lipid profile  Colonoscopy is scheduled for June 2024  CT chest and ultrasound abdomen to screen for abdominal aortic aneurysm is ordered but not yet done  Patient is given Number to call and make an appointment.  We still do not know reason for patient being on Xarelto Will try to reach out to previous PCP again  Patient has reached out to previous PCP and requested records to be forwarded to our office     Medication List with Changes/Refills   Current Medications    APIXABAN (ELIQUIS) 5 MG TAB    Take 5 mg by mouth 2 (two) times daily.    ATORVASTATIN (LIPITOR) 80 MG TABLET    Take 1 tablet (80 mg total) by mouth once daily.    BUPROPION (WELLBUTRIN XL) 300 MG 24 HR TABLET    Take 300 mg by mouth once daily.    CYANOCOBALAMIN (VITAMIN B-12) 1000 MCG TABLET    Take 100 mcg by mouth once daily. OTC    EZETIMIBE (ZETIA) 10 MG TABLET    Take 1 tablet (10 mg total) by mouth once daily.    FOLIC ACID (FOLVITE) 400 MCG TABLET    Take 400 mcg by mouth once daily.    OXCARBAZEPINE (TRILEPTAL) 150 MG TAB    Take 1 tablet (150 mg total) by mouth 2 (two) times daily.    SERTRALINE (ZOLOFT) 100 MG TABLET    Take 100 mg by mouth once " daily.    SOD SULF-POT CHLORIDE-MAG SULF (SUTAB) 1.479-0.188- 0.225 GRAM TABLET    Take according to package instructions with indicated amount of water. No breakfast day before test. May substitute with Suprep, Clenpiq, Plenvu, Moviprep or GoLytely based on Rx plan and patient preference.   Changed and/or Refilled Medications    Modified Medication Previous Medication    EFAVIRENZ-EMTRICTABINE-TENOFOVIR 600-200-300 MG (ATRIPLA) 600-200-300 MG TAB efavirenz-emtrictabine-tenofovir 600-200-300 mg (ATRIPLA) 600-200-300 mg Tab       Take 1 tablet by mouth every evening.    Take 1 tablet by mouth every evening.    PRAMIPEXOLE (MIRAPEX) 0.5 MG TABLET pramipexole (MIRAPEX) 0.5 MG tablet       Take 1 tablet (0.5 mg total) by mouth 3 (three) times daily.    Take 0.5 mg by mouth 3 (three) times daily.

## 2024-04-11 ENCOUNTER — PATIENT OUTREACH (OUTPATIENT)
Dept: ADMINISTRATIVE | Facility: HOSPITAL | Age: 69
End: 2024-04-11
Payer: MEDICARE

## 2024-04-11 ENCOUNTER — OFFICE VISIT (OUTPATIENT)
Dept: INFECTIOUS DISEASES | Facility: CLINIC | Age: 69
End: 2024-04-11
Payer: MEDICARE

## 2024-04-11 DIAGNOSIS — Z21 ASYMPTOMATIC HIV INFECTION, WITH NO HISTORY OF HIV-RELATED ILLNESS: Primary | ICD-10-CM

## 2024-04-11 PROCEDURE — 99214 OFFICE O/P EST MOD 30 MIN: CPT | Mod: S$GLB,ICN,, | Performed by: INTERNAL MEDICINE

## 2024-04-11 RX ORDER — EFAVIRENZ, EMTRICITABINE AND TENOFOVIR DISOPROXIL FUMARATE 600; 200; 300 MG/1; MG/1; MG/1
1 TABLET, FILM COATED ORAL NIGHTLY
Qty: 90 TABLET | Refills: 3 | Status: SHIPPED | OUTPATIENT
Start: 2024-04-11 | End: 2025-04-11

## 2024-04-11 NOTE — ASSESSMENT & PLAN NOTE
Doing well at this time, would like to continue Atripla, will prescribe refill today.  Repeat labs next visit.

## 2024-04-11 NOTE — PROGRESS NOTES
Subjective:       Patient ID: Hemant Pepe is a 68 y.o. male.    Chief Complaint: Referral    Here for new patient visit for HIV.  Reports he was diagnosed in 2003.  Was an incidental finding prior to surgery, never had any HIV related illnesses.  He was started on Atripla at that time and has been on it since then.  No adverse effects noted.  Excellent compliance.  CD4 was >500 and viral load undetectable as of January 2024.  No acute issues or concerns at this time.  Requested medication refill to mail order pharmacy.  Review of Systems   Constitutional:  Negative for chills and fever.   Respiratory:  Negative for cough and shortness of breath.    Cardiovascular:  Negative for chest pain and palpitations.   Gastrointestinal:  Negative for diarrhea, nausea and vomiting.     Objective:      Physical Exam  Vitals and nursing note reviewed.   Constitutional:       Appearance: Normal appearance. He is well-developed.   HENT:      Right Ear: External ear normal.      Left Ear: External ear normal.   Eyes:      General: No scleral icterus.     Conjunctiva/sclera: Conjunctivae normal.   Cardiovascular:      Rate and Rhythm: Normal rate and regular rhythm.      Heart sounds: Normal heart sounds. No murmur heard.  Pulmonary:      Effort: Pulmonary effort is normal. No respiratory distress.      Breath sounds: Normal breath sounds. No wheezing.   Abdominal:      General: Bowel sounds are normal. There is no distension.      Palpations: Abdomen is soft.      Tenderness: There is no abdominal tenderness. There is no guarding.   Skin:     General: Skin is warm and dry.      Findings: No rash.   Neurological:      Mental Status: He is alert and oriented to person, place, and time. Mental status is at baseline.   Psychiatric:         Mood and Affect: Mood normal.         Behavior: Behavior normal.       Assessment:       1. Asymptomatic HIV infection, with no history of HIV-related illness        Plan:       Problem List  Items Addressed This Visit          ID    Asymptomatic HIV infection, with no history of HIV-related illness - Primary     Doing well at this time, would like to continue Atripla, will prescribe refill today.  Repeat labs next visit.         Relevant Medications    efavirenz-emtrictabine-tenofovir 600-200-300 mg (ATRIPLA) 600-200-300 mg Tab

## 2024-04-19 DIAGNOSIS — I71.40 ABDOMINAL AORTIC ANEURYSM (AAA) WITHOUT RUPTURE, UNSPECIFIED PART: Primary | ICD-10-CM

## 2024-04-23 ENCOUNTER — TELEPHONE (OUTPATIENT)
Dept: PRIMARY CARE CLINIC | Facility: CLINIC | Age: 69
End: 2024-04-23
Payer: MEDICARE

## 2024-04-23 NOTE — TELEPHONE ENCOUNTER
----- Message from Tammy Gomez sent at 4/23/2024  9:48 AM CDT -----  Contact: self  Type:  Test Results    Who Called: Hemant Pepe  Name of Test (Lab/Mammo/Etc):   Date of Test: 04/2024  Ordering Provider: bobo  Where the test was performed: office  Would the patient rather a call back or a response via [facility]?   Best Call Back Number: [phone]  Additional Information:  [phone]

## 2024-04-29 DIAGNOSIS — Z12.11 COLON CANCER SCREENING: ICD-10-CM

## 2024-04-29 RX ORDER — SERTRALINE HYDROCHLORIDE 100 MG/1
100 TABLET, FILM COATED ORAL DAILY
Qty: 90 TABLET | Refills: 3 | Status: SHIPPED | OUTPATIENT
Start: 2024-04-29 | End: 2025-04-29

## 2024-04-29 RX ORDER — SOD SULF/POT CHLORIDE/MAG SULF 1.479 G
TABLET ORAL
Qty: 24 TABLET | Refills: 0 | Status: SHIPPED | OUTPATIENT
Start: 2024-04-29

## 2024-04-29 NOTE — TELEPHONE ENCOUNTER
----- Message from Chika Chapa sent at 4/29/2024 10:29 AM CDT -----  Contact: self  Type: Staff Message    Caller: sodium  Call Back Number: [phone]  Nature of the Call: checking the status of pt  Disp Refills Start End JOHNNY  sertraline (ZOLOFT) 100 MG tablet     Additional Information: na

## 2024-05-01 ENCOUNTER — TELEPHONE (OUTPATIENT)
Dept: PRIMARY CARE CLINIC | Facility: CLINIC | Age: 69
End: 2024-05-01
Payer: MEDICARE

## 2024-05-01 NOTE — TELEPHONE ENCOUNTER
NO  PA COMPLETED--PT PICKED UP HIS SUTAB  ON 04/29/24 AND PAID OUT OF POCKET $79.00--PER HIS PHARMACY

## 2024-05-15 ENCOUNTER — TELEPHONE (OUTPATIENT)
Dept: GASTROENTEROLOGY | Facility: CLINIC | Age: 69
End: 2024-05-15
Payer: MEDICARE

## 2024-05-15 VITALS — WEIGHT: 147 LBS | HEIGHT: 67 IN | BODY MASS INDEX: 23.07 KG/M2

## 2024-05-15 DIAGNOSIS — Z12.11 COLON CANCER SCREENING: Primary | ICD-10-CM

## 2024-05-15 NOTE — TELEPHONE ENCOUNTER
"[facility] [facility] [facility]  [address] [place] [address]  Phone: [phone]  Fax: [phone]    History & Physical         Provider: Dr. Meggan Styles    Patient Name: Hemant RIVERA (age):1955  68 y.o.           Gender: male   Phone: [phone]     Referring Physician: Andrew Walters     Vital Signs:   Height - 5' 7"  Weight - 147 lb  BMI -  23.02    Plan: Colonoscopy @ [facility]    Encounter Diagnosis   Name Primary?    Colon cancer screening Yes           History:      Past Medical History:   Diagnosis Date    CAD (coronary artery disease)     Depression     HIV infection     Hyperlipidemia     Restless leg syndrome       Past Surgical History:   Procedure Laterality Date    CORONARY ARTERY BYPASS GRAFT  2017    [place]      Medication List with Changes/Refills   Current Medications    APIXABAN (ELIQUIS) 5 MG TAB    Take 5 mg by mouth 2 (two) times daily.    ATORVASTATIN (LIPITOR) 80 MG TABLET    Take 1 tablet (80 mg total) by mouth once daily.    BUPROPION (WELLBUTRIN XL) 300 MG 24 HR TABLET    Take 300 mg by mouth once daily.    CYANOCOBALAMIN (VITAMIN B-12) 1000 MCG TABLET    Take 100 mcg by mouth once daily. OTC    EFAVIRENZ-EMTRICTABINE-TENOFOVIR 600-200-300 MG (ATRIPLA) 600-200-300 MG TAB    Take 1 tablet by mouth every evening.    EZETIMIBE (ZETIA) 10 MG TABLET    Take 1 tablet (10 mg total) by mouth once daily.    FOLIC ACID (FOLVITE) 400 MCG TABLET    Take 400 mcg by mouth once daily.    OXCARBAZEPINE (TRILEPTAL) 150 MG TAB    Take 1 tablet (150 mg total) by mouth 2 (two) times daily.    PRAMIPEXOLE (MIRAPEX) 0.5 MG TABLET    Take 1 tablet (0.5 mg total) by mouth 3 (three) times daily.    SERTRALINE (ZOLOFT) 100 MG TABLET    Take 1 tablet (100 mg total) by mouth once daily.    SOD SULF-POT CHLORIDE-MAG SULF (SUTAB) 1.479-0.188- 0.225 GRAM TABLET    Take according to package " instructions with indicated amount of water. No breakfast day before test. May substitute with Suprep, Clenpiq, Plenvu, Moviprep or GoLytely based on Rx plan and patient preference.      Review of patient's allergies indicates:  No Known Allergies   Family History   Problem Relation Name Age of Onset    Heart failure Mother      Prostate cancer Father      Heart failure Sister      Prostate cancer Brother      Colon cancer Neg Hx      Colon polyps Neg Hx      Irritable bowel syndrome Neg Hx      Liver cancer Neg Hx      Rectal cancer Neg Hx      Stomach cancer Neg Hx      Ulcerative colitis Neg Hx        Social History     Tobacco Use    Smoking status: Every Day     Current packs/day: 0.50     Average packs/day: 0.9 packs/day for 49.4 years (45.7 ttl pk-yrs)     Types: Cigarettes     Start date: 1975    Smokeless tobacco: Never   Substance Use Topics    Alcohol use: Yes     Alcohol/week: 1.0 standard drink of alcohol     Types: 1 Shots of liquor per week    Drug use: Not Currently        Physical Examination:     General Appearance:___________________________  HEENT: _____________________________________  Abdomen:____________________________________  Heart:________________________________________  Lungs:_______________________________________  Extremities:___________________________________  Skin:_________________________________________  Endocrine:____________________________________  Genitourinary:_________________________________  Neurological:__________________________________      Patient has been evaluated immediately prior to sedation and is medically cleared for endoscopy with IVCS as an ASA class: ______      Physician Signature: _________________________       Date: ________  Time: ________

## 2024-05-15 NOTE — TELEPHONE ENCOUNTER
Called and left a detailed message that I was calling as a courtesy regarding upcoming COLON with NBP on 5/20/24, Monday and wanted to verify that he has his paper prep instructions and meds. I reminded pt not to take Eliquis 2 days before the procedure.  I also mentioned that [facility] will call the day before ( FRI) with the arrival time, GI Lab is located on the third floor and pre-register. corey

## 2024-05-20 ENCOUNTER — OUTSIDE PLACE OF SERVICE (OUTPATIENT)
Dept: GASTROENTEROLOGY | Facility: CLINIC | Age: 69
End: 2024-05-20
Payer: MEDICARE

## 2024-05-20 LAB — CRC RECOMMENDATION EXT: NORMAL

## 2024-05-20 PROCEDURE — 45380 COLONOSCOPY AND BIOPSY: CPT | Mod: PT,59,, | Performed by: INTERNAL MEDICINE

## 2024-05-20 PROCEDURE — 45385 COLONOSCOPY W/LESION REMOVAL: CPT | Mod: PT,,, | Performed by: INTERNAL MEDICINE

## 2024-05-23 NOTE — TELEPHONE ENCOUNTER
----- Message from Sangeeta Salgado sent at 5/23/2024 11:46 AM CDT -----  Contact: self  Patient is requesting buPROPion (WELLBUTRIN XL) 300 MG 24 hr tablet be called out to the pharmacy. Pt states he has called twice for this to be done, please advise and call back at [phone]      [id number] - [place], [place] - [address]  [address]  [place] [address]  Phone: [phone] Fax: [phone]

## 2024-05-27 RX ORDER — BUPROPION HYDROCHLORIDE 300 MG/1
300 TABLET ORAL DAILY
Qty: 90 TABLET | Refills: 3 | Status: SHIPPED | OUTPATIENT
Start: 2024-05-27 | End: 2025-05-27

## 2024-06-02 ENCOUNTER — TELEPHONE (OUTPATIENT)
Dept: GASTROENTEROLOGY | Facility: CLINIC | Age: 69
End: 2024-06-02
Payer: MEDICARE

## 2024-06-02 NOTE — TELEPHONE ENCOUNTER
1 TA, 1 hyp, 1 benign, repeat colonoscopy in 3 years.     Notify patient. Confirm recall tab in appointment desk is up-to-date (update if needed).  NBP

## 2024-06-03 NOTE — TELEPHONE ENCOUNTER
Results discussed with patient per providers chart remarks. Patient voices understanding/agreement. Recall verified/updated. -Rupa NORRIS CMA

## 2024-06-03 NOTE — TELEPHONE ENCOUNTER
----- Message from Fern Cantrell sent at 6/3/2024  3:11 PM CDT -----  Regarding: Return Call  Contact: patient  Type:  Patient Returning Call    Who Called:Hemant   Who Left Message for Patient: Hema   Does the patient know what this is regarding?:result from Colonoscopy   Would the patient rather a call back or a response via [facility]?  Call back   Best Call Back Number: [phone] (home)     Additional Information:     Santana,  JOHANA

## 2024-06-03 NOTE — TELEPHONE ENCOUNTER
Staff attempted to contact pt to give colonoscopy results... no answer.. LMVOM to call ofc....   ira

## 2024-06-11 ENCOUNTER — DOCUMENTATION ONLY (OUTPATIENT)
Dept: GASTROENTEROLOGY | Facility: CLINIC | Age: 69
End: 2024-06-11
Payer: MEDICARE

## 2024-07-02 ENCOUNTER — OFFICE VISIT (OUTPATIENT)
Dept: PRIMARY CARE CLINIC | Facility: CLINIC | Age: 69
End: 2024-07-02
Payer: MEDICARE

## 2024-07-02 VITALS
WEIGHT: 144.38 LBS | HEART RATE: 60 BPM | RESPIRATION RATE: 18 BRPM | BODY MASS INDEX: 22.66 KG/M2 | OXYGEN SATURATION: 99 % | TEMPERATURE: 98 F | DIASTOLIC BLOOD PRESSURE: 73 MMHG | SYSTOLIC BLOOD PRESSURE: 108 MMHG | HEIGHT: 67 IN

## 2024-07-02 DIAGNOSIS — I71.40 ABDOMINAL AORTIC ANEURYSM (AAA) WITHOUT RUPTURE, UNSPECIFIED PART: ICD-10-CM

## 2024-07-02 DIAGNOSIS — F33.41 RECURRENT MAJOR DEPRESSIVE DISORDER, IN PARTIAL REMISSION: Primary | ICD-10-CM

## 2024-07-02 DIAGNOSIS — I25.10 CORONARY ARTERY DISEASE INVOLVING NATIVE CORONARY ARTERY OF NATIVE HEART WITHOUT ANGINA PECTORIS: ICD-10-CM

## 2024-07-02 DIAGNOSIS — Z21 ASYMPTOMATIC HIV INFECTION, WITH NO HISTORY OF HIV-RELATED ILLNESS: ICD-10-CM

## 2024-07-02 PROCEDURE — 99214 OFFICE O/P EST MOD 30 MIN: CPT | Mod: S$GLB,,, | Performed by: INTERNAL MEDICINE

## 2024-07-02 RX ORDER — METOPROLOL SUCCINATE 25 MG/1
25 TABLET, EXTENDED RELEASE ORAL DAILY
COMMUNITY
Start: 2024-06-25

## 2024-07-02 NOTE — PROGRESS NOTES
Subjective:      Patient ID: Hemant Pepe is a 68 y.o. male.    Chief Complaint: No chief complaint on file.    HPI: Patient is tested positive for HIV in 2003 and is on antiviral. Patient is tolerating the medication well and reports compliance with medication. Patient last CD4 count is 595 and viral load is undetectable.  Is being followed by Dr. Hamman.     Patient has coronary artery disease s/p CABG in 2017. Patient denies any chest pain shortness of breath ankle swelling. Patient last LDL of 59 is not at goal. Patient reports compliance with atorvastatin and Zetia. Patient reports he was evaluated by Cardiologist/Dr Méndez and he was recommended to continue eliquis. Patient is not aware of the indication.  Patient had ultrasound abdomen to screen for Abdominal aortic aneurysm and was found to have an abdominal aortic aneurysm measuring 4.8 anteroposteriorly and 4 cm horizontally.  Patient is being followed by cardiologist    Patient has depression for which he is prescribed Wellbutrin, Zoloft and Trileptal.  Patient reports depression is under okay control.  Patient denies crying spell, has lack of energy/interest, still reports some feeling of guilt and worthlessness, ( Patient had traumatic past).  Patient has some trouble with law as well and is still fighting a case in court. Patient Kids are grown up and are not willing to do anything with him.    Review of Systems   Constitutional:  Negative for chills, diaphoresis, fever, malaise/fatigue and weight loss.   HENT:  Negative for congestion, ear pain, sinus pain, sore throat and tinnitus.    Eyes:  Negative for blurred vision and photophobia.   Respiratory:  Negative for cough, hemoptysis, shortness of breath and wheezing.    Cardiovascular:  Negative for chest pain, palpitations, orthopnea, leg swelling and PND.   Gastrointestinal:  Negative for abdominal pain, blood in stool, constipation, diarrhea, heartburn, melena, nausea and vomiting.  "  Genitourinary:  Negative for dysuria, frequency and urgency.   Musculoskeletal:  Negative for back pain, myalgias and neck pain.   Skin:  Negative for rash.   Neurological:  Negative for dizziness, tremors, seizures, loss of consciousness and weakness.   Endo/Heme/Allergies:  Negative for polydipsia.   Psychiatric/Behavioral:  Positive for depression. Negative for hallucinations. The patient does not have insomnia.      Objective:   /73 (BP Location: Right arm, Patient Position: Sitting, BP Method: Medium (Automatic))   Pulse 60   Temp 98.1 °F (36.7 °C) (Oral)   Resp 18   Ht 5' 7" (1.702 m)   Wt 65.5 kg (144 lb 6.4 oz)   SpO2 99%   BMI 22.62 kg/m²     Physical Exam  Assessment:       ICD-10-CM ICD-9-CM   1. Recurrent major depressive disorder, in partial remission  F33.41 296.35   2. Abdominal aortic aneurysm (AAA) without rupture, unspecified part  I71.40 441.4   3. Coronary artery disease involving native coronary artery of native heart without angina pectoris  I25.10 414.01   4. Asymptomatic HIV infection, with no history of HIV-related illness  Z21 V08       Plan:      Patient with coronary artery disease s/p  CABG   Aggressive blood pressure and cholesterol control is recommended   Last LDL is not at goal and repeat lipid profile and CMP is ordered   Advised patient to get labs done   Patient  is recently diagnosed to have abdominal aortic aneurysm and is being followed by cardiologist   Patient reports Eliquis is continued by cardiologist   Will try to get notes from cardiologist to better understand indication for anticoagulation   Patient has HIV and is under care of Infectious Disease Dr. Hamman   Advised patient to keep appointment   Patient has depression that does not seem under good control.   Will continue same medication for now and refer to psychiatrist    Medication List with Changes/Refills   Current Medications    APIXABAN (ELIQUIS) 5 MG TAB    Take 5 mg by mouth 2 (two) times " daily.    ATORVASTATIN (LIPITOR) 80 MG TABLET    Take 1 tablet (80 mg total) by mouth once daily.    BUPROPION (WELLBUTRIN XL) 300 MG 24 HR TABLET    Take 1 tablet (300 mg total) by mouth once daily.    CYANOCOBALAMIN (VITAMIN B-12) 1000 MCG TABLET    Take 100 mcg by mouth once daily. OTC    EFAVIRENZ-EMTRICTABINE-TENOFOVIR 600-200-300 MG (ATRIPLA) 600-200-300 MG TAB    Take 1 tablet by mouth every evening.    EZETIMIBE (ZETIA) 10 MG TABLET    Take 1 tablet (10 mg total) by mouth once daily.    FOLIC ACID (FOLVITE) 400 MCG TABLET    Take 400 mcg by mouth once daily.    METOPROLOL SUCCINATE (TOPROL-XL) 25 MG 24 HR TABLET    Take 25 mg by mouth once daily.    OXCARBAZEPINE (TRILEPTAL) 150 MG TAB    Take 1 tablet (150 mg total) by mouth 2 (two) times daily.    PRAMIPEXOLE (MIRAPEX) 0.5 MG TABLET    Take 1 tablet (0.5 mg total) by mouth 3 (three) times daily.    SERTRALINE (ZOLOFT) 100 MG TABLET    Take 1 tablet (100 mg total) by mouth once daily.   Discontinued Medications    SOD SULF-POT CHLORIDE-MAG SULF (SUTAB) 1.479-0.188- 0.225 GRAM TABLET    Take according to package instructions with indicated amount of water. No breakfast day before test. May substitute with Suprep, Clenpiq, Plenvu, Moviprep or GoLytely based on Rx plan and patient preference.

## 2024-08-05 ENCOUNTER — TELEPHONE (OUTPATIENT)
Dept: PRIMARY CARE CLINIC | Facility: CLINIC | Age: 69
End: 2024-08-05
Payer: MEDICARE

## 2024-11-04 ENCOUNTER — OFFICE VISIT (OUTPATIENT)
Dept: PRIMARY CARE CLINIC | Facility: CLINIC | Age: 69
End: 2024-11-04
Payer: MEDICARE

## 2024-11-04 VITALS
BODY MASS INDEX: 23.34 KG/M2 | TEMPERATURE: 99 F | SYSTOLIC BLOOD PRESSURE: 105 MMHG | WEIGHT: 148.69 LBS | RESPIRATION RATE: 16 BRPM | HEIGHT: 67 IN | DIASTOLIC BLOOD PRESSURE: 64 MMHG | HEART RATE: 65 BPM | OXYGEN SATURATION: 95 %

## 2024-11-04 DIAGNOSIS — Z23 FLU VACCINE NEED: Primary | ICD-10-CM

## 2024-11-04 DIAGNOSIS — F33.41 RECURRENT MAJOR DEPRESSIVE DISORDER, IN PARTIAL REMISSION: ICD-10-CM

## 2024-11-04 DIAGNOSIS — I25.10 CORONARY ARTERY DISEASE INVOLVING NATIVE CORONARY ARTERY OF NATIVE HEART WITHOUT ANGINA PECTORIS: ICD-10-CM

## 2024-11-04 DIAGNOSIS — L84 CORN OF FOOT: ICD-10-CM

## 2024-11-04 DIAGNOSIS — Z21 HIV POSITIVE, ASYMPTOMATIC: ICD-10-CM

## 2024-11-04 NOTE — PROGRESS NOTES
Subjective:      Patient ID: Hemant Pepe is a 69 y.o. male.    Chief Complaint: Warts (C/o wart under Lt foot, tried otc wart remover w/o much help)    : Patient is tested positive for HIV in 2003 and is on antiviral. Patient is tolerating the medication well and reports compliance with medication. Patient last CD4 count is 595 and viral load is undetectable.  Is being followed by Dr. Hamman.     Patient has coronary artery disease s/p CABG in 2017. Patient denies any chest pain shortness of breath ankle swelling. Patient last LDL of 59 is not at goal. Patient reports compliance with atorvastatin and Zetia. Patient reports he was evaluated by Cardiologist/Dr Méndez and he was recommended to continue eliquis. Patient is not aware of the indication. (previously tried to get Cardiology notes without success will try again) today patient reports that cardiology NP told him that the indication for Eliquis is CABG surgery??.  Patient also has abdominal aortic aneurysm measuring 4.8 cm anteroposteriorly and 4 cm horizontally.  Patient is being followed by cardiologist    Patient presented today with complains of left foot corn x long.  Patient reports it is getting more painful.     Review of Systems   Constitutional:  Negative for chills, diaphoresis, fever, malaise/fatigue and weight loss.   HENT:  Negative for congestion, ear pain, sinus pain, sore throat and tinnitus.    Eyes:  Negative for blurred vision and photophobia.   Respiratory:  Negative for cough, hemoptysis, shortness of breath and wheezing.    Cardiovascular:  Negative for chest pain, palpitations, orthopnea, leg swelling and PND.   Gastrointestinal:  Negative for abdominal pain, blood in stool, constipation, diarrhea, heartburn, melena, nausea and vomiting.   Genitourinary:  Negative for dysuria, frequency and urgency.   Musculoskeletal:  Negative for back pain, myalgias and neck pain.   Skin:  Negative for rash.   Neurological:  Negative for  "dizziness, tremors, seizures, loss of consciousness and weakness.   Endo/Heme/Allergies:  Negative for polydipsia.   Psychiatric/Behavioral:  Negative for depression and hallucinations. The patient does not have insomnia.      Objective:   /64 (BP Location: Left arm, Patient Position: Sitting)   Pulse 65   Temp 98.8 °F (37.1 °C) (Oral)   Resp 16   Ht 5' 7" (1.702 m)   Wt 67.4 kg (148 lb 11.2 oz)   SpO2 95%   BMI 23.29 kg/m²   Physical Exam  Constitutional:       General: He is not in acute distress.     Appearance: He is not diaphoretic.   Neck:      Thyroid: No thyromegaly.   Cardiovascular:      Rate and Rhythm: Normal rate and regular rhythm.      Heart sounds: Normal heart sounds. No murmur heard.  Pulmonary:      Effort: Pulmonary effort is normal. No respiratory distress.      Breath sounds: Normal breath sounds. No wheezing.   Abdominal:      General: Bowel sounds are normal. There is no distension.      Palpations: Abdomen is soft.      Tenderness: There is no abdominal tenderness.   Lymphadenopathy:      Cervical: No cervical adenopathy.   Skin:     Comments: 1.5 cm round area of thickened skin with central punctum located at head of 2nd and 3rd Metatarsal  Area is tender to palpation.  No surrounding redness or warmth noted   Neurological:      Mental Status: He is alert and oriented to person, place, and time.   Psychiatric:         Behavior: Behavior normal.         Thought Content: Thought content normal.         Judgment: Judgment normal.     Assessment:       ICD-10-CM ICD-9-CM   1. Corn of foot  L84 700   2. Recurrent major depressive disorder, in partial remission  F33.41 296.35   3. Coronary artery disease involving native coronary artery of native heart without angina pectoris  I25.10 414.01   4. HIV positive, asymptomatic  Z21 V08       Plan:     Patient with coronary artery disease s/p CABG in 2017  Patient last LDL of 59 is not at goal.  Repeat labs are ordered but not yet done " advised patient to get labs done  Patient is on Eliquis and indication for that is still not known  Will reach out to cardiologist office to get notes  Patient has depression that seem under okay control  Will continue medication.  Patient is being followed by psychiatrist  Patient HIV positive and CD4 count is more than 500 + viral load is undetectable  Will continue same antiviral  Patient is being followed by infectious disease  Will administer flu vaccine  Patient has corn on the left foot Will refer to podiatrist  Medication List with Changes/Refills   Current Medications    APIXABAN (ELIQUIS) 5 MG TAB    Take 5 mg by mouth 2 (two) times daily.    ATORVASTATIN (LIPITOR) 80 MG TABLET    Take 1 tablet (80 mg total) by mouth once daily.    BUPROPION (WELLBUTRIN XL) 300 MG 24 HR TABLET    Take 1 tablet (300 mg total) by mouth once daily.    CYANOCOBALAMIN (VITAMIN B-12) 1000 MCG TABLET    Take 100 mcg by mouth once daily. OTC    EFAVIRENZ-EMTRICTABINE-TENOFOVIR 600-200-300 MG (ATRIPLA) 600-200-300 MG TAB    Take 1 tablet by mouth every evening.    EZETIMIBE (ZETIA) 10 MG TABLET    Take 1 tablet (10 mg total) by mouth once daily.    FOLIC ACID (FOLVITE) 400 MCG TABLET    Take 400 mcg by mouth once daily.    METOPROLOL SUCCINATE (TOPROL-XL) 25 MG 24 HR TABLET    Take 25 mg by mouth once daily.    OXCARBAZEPINE (TRILEPTAL) 150 MG TAB    Take 1 tablet (150 mg total) by mouth 2 (two) times daily.    PRAMIPEXOLE (MIRAPEX) 0.5 MG TABLET    Take 1 tablet (0.5 mg total) by mouth 3 (three) times daily.    SERTRALINE (ZOLOFT) 100 MG TABLET    Take 1 tablet (100 mg total) by mouth once daily.

## 2024-12-06 ENCOUNTER — TELEPHONE (OUTPATIENT)
Dept: PRIMARY CARE CLINIC | Facility: CLINIC | Age: 69
End: 2024-12-06
Payer: MEDICARE

## 2024-12-06 NOTE — TELEPHONE ENCOUNTER
RECEIVED A PA REQUEST FROM [facility]  FOR NEXLIZET 180-10 MG TABS --I DO NOT SEE THIS MED LISTED IN HIS MEDS LIST--IS PT TO BE TAKING??WILL DO PA IF NEEDED

## 2024-12-06 NOTE — TELEPHONE ENCOUNTER
Suggest that he is on atorvastatin and Zetia and not Nexlizet.   [place].  If new PCP has started this medication he will have to do prior authorization

## 2024-12-11 ENCOUNTER — TELEPHONE (OUTPATIENT)
Dept: PRIMARY CARE CLINIC | Facility: CLINIC | Age: 69
End: 2024-12-11
Payer: MEDICARE

## 2024-12-11 NOTE — TELEPHONE ENCOUNTER
PA  INFORMED OF MESSAGE FROM DR BARNEY THAT HE IS TO BE ON ZETIA AND ATORVASTATIN --HE DID NOT RX HIM NEXLIZET--PT STATES HIS CARDIOLOGIST ORDERED THE NEXLIZET--PT INFORMED THAN HIS CARDIOLOGIST WILL NEED TO DO THE PA SINCE HE IS THE MD ORDERING THE MED

## 2025-01-25 DIAGNOSIS — G25.81 RESTLESS LEG SYNDROME: ICD-10-CM

## 2025-01-27 RX ORDER — PRAMIPEXOLE DIHYDROCHLORIDE 0.5 MG/1
0.5 TABLET ORAL 3 TIMES DAILY
Qty: 270 TABLET | Refills: 3 | Status: SHIPPED | OUTPATIENT
Start: 2025-01-27

## 2025-03-12 DIAGNOSIS — Z21 ASYMPTOMATIC HIV INFECTION, WITH NO HISTORY OF HIV-RELATED ILLNESS: ICD-10-CM

## 2025-03-12 RX ORDER — EFAVIRENZ, EMTRICITABINE AND TENOFOVIR DISOPROXIL FUMARATE 600; 200; 300 MG/1; MG/1; MG/1
1 TABLET, FILM COATED ORAL NIGHTLY
Qty: 90 TABLET | Refills: 3 | Status: SHIPPED | OUTPATIENT
Start: 2025-03-12 | End: 2026-03-12

## 2025-04-15 DIAGNOSIS — F33.41 RECURRENT MAJOR DEPRESSIVE DISORDER, IN PARTIAL REMISSION: Primary | ICD-10-CM

## 2025-04-15 RX ORDER — SERTRALINE HYDROCHLORIDE 100 MG/1
100 TABLET, FILM COATED ORAL
Qty: 90 TABLET | Refills: 0 | Status: SHIPPED | OUTPATIENT
Start: 2025-04-15

## 2025-04-16 ENCOUNTER — TELEPHONE (OUTPATIENT)
Dept: PRIMARY CARE CLINIC | Facility: CLINIC | Age: 70
End: 2025-04-16
Payer: MEDICARE

## 2025-04-17 ENCOUNTER — OFFICE VISIT (OUTPATIENT)
Dept: INFECTIOUS DISEASES | Facility: CLINIC | Age: 70
End: 2025-04-17
Payer: MEDICARE

## 2025-04-17 VITALS
HEIGHT: 67 IN | RESPIRATION RATE: 16 BRPM | OXYGEN SATURATION: 98 % | BODY MASS INDEX: 23.23 KG/M2 | DIASTOLIC BLOOD PRESSURE: 66 MMHG | WEIGHT: 148 LBS | SYSTOLIC BLOOD PRESSURE: 105 MMHG | HEART RATE: 66 BPM

## 2025-04-17 DIAGNOSIS — Z21 ASYMPTOMATIC HIV INFECTION, WITH NO HISTORY OF HIV-RELATED ILLNESS: Primary | ICD-10-CM

## 2025-04-17 NOTE — ASSESSMENT & PLAN NOTE
Doing well at this time, will continue Atripla.  Will get labs at his PCP visit in June, will contact him once we see those results.

## 2025-04-17 NOTE — PROGRESS NOTES
Subjective:       Patient ID: Hemant Pepe is a 69 y.o. male.    Chief Complaint: No chief complaint on file.    Here for follow up visit for HIV.  Was diagnosed in 2003.  Remains on Atripla.  No adverse effects noted.  Excellent compliance.  CD4 was >500 and viral load undetectable as of January 2024.  No acute issues or concerns at this time.  Needs to get labs updated but wants to wait until bloodwork for PCP appointment in June.  Review of Systems   Constitutional:  Negative for chills and fever.   Respiratory:  Negative for cough and shortness of breath.    Cardiovascular:  Negative for chest pain and palpitations.   Gastrointestinal:  Negative for diarrhea, nausea and vomiting.       Objective:      Physical Exam  Vitals and nursing note reviewed.   Constitutional:       Appearance: Normal appearance. He is well-developed.   HENT:      Right Ear: External ear normal.      Left Ear: External ear normal.   Eyes:      General: No scleral icterus.     Conjunctiva/sclera: Conjunctivae normal.   Cardiovascular:      Rate and Rhythm: Normal rate and regular rhythm.      Heart sounds: Normal heart sounds. No murmur heard.  Pulmonary:      Effort: Pulmonary effort is normal. No respiratory distress.      Breath sounds: Normal breath sounds. No wheezing.   Abdominal:      General: Bowel sounds are normal. There is no distension.      Palpations: Abdomen is soft.      Tenderness: There is no abdominal tenderness. There is no guarding.   Skin:     General: Skin is warm and dry.      Findings: No rash.   Neurological:      Mental Status: He is alert and oriented to person, place, and time. Mental status is at baseline.   Psychiatric:         Mood and Affect: Mood normal.         Behavior: Behavior normal.       Assessment:       1. Asymptomatic HIV infection, with no history of HIV-related illness        Plan:       Problem List Items Addressed This Visit       Asymptomatic HIV infection, with no history of  HIV-related illness - Primary    Doing well at this time, will continue Atripla.  Will get labs at his PCP visit in June, will contact him once we see those results.